# Patient Record
Sex: MALE | Race: BLACK OR AFRICAN AMERICAN | NOT HISPANIC OR LATINO | URBAN - METROPOLITAN AREA
[De-identification: names, ages, dates, MRNs, and addresses within clinical notes are randomized per-mention and may not be internally consistent; named-entity substitution may affect disease eponyms.]

---

## 2022-06-20 ENCOUNTER — EMERGENCY (EMERGENCY)
Facility: HOSPITAL | Age: 37
LOS: 1 days | Discharge: HOME | End: 2022-06-20
Admitting: STUDENT IN AN ORGANIZED HEALTH CARE EDUCATION/TRAINING PROGRAM
Payer: SELF-PAY

## 2022-06-20 VITALS
HEART RATE: 71 BPM | OXYGEN SATURATION: 97 % | HEIGHT: 73 IN | TEMPERATURE: 98 F | SYSTOLIC BLOOD PRESSURE: 149 MMHG | RESPIRATION RATE: 18 BRPM | WEIGHT: 218.04 LBS | DIASTOLIC BLOOD PRESSURE: 78 MMHG

## 2022-06-20 PROCEDURE — 99282 EMERGENCY DEPT VISIT SF MDM: CPT

## 2022-06-20 PROCEDURE — 99283 EMERGENCY DEPT VISIT LOW MDM: CPT

## 2022-06-20 RX ORDER — HYDROCORTISONE 1 %
1 OINTMENT (GRAM) TOPICAL
Qty: 1 | Refills: 0
Start: 2022-06-20 | End: 2022-06-26

## 2022-06-20 NOTE — ED PROVIDER NOTE - OBJECTIVE STATEMENT
35 y/o male with no PMHx is here in the ED c/o rectal pain x2-3 days. Pain is intermittent not associated with bm. He denies the following: fever, chills, abdominal pain, n/v, constipation/diarrhea, urinary symptoms, bleeding, itching, mass/lesions, anal sex or fb insertion. Furthermore, despite triage, pt denies any flu-like symptoms.

## 2022-06-20 NOTE — ED PROVIDER NOTE - PATIENT PORTAL LINK FT
You can access the FollowMyHealth Patient Portal offered by [facility] by registering at the following website: [email or web address]. By joining [employer]’s FollowMyHealth portal, you will also be able to view your health information using other applications (apps) compatible with our system.

## 2022-06-20 NOTE — ED ADULT NURSE NOTE - CAS ELECT INFOMATION PROVIDED
Pt, D/C with outpatient follow-up./DC instructions Pt, D/C with outpatient follow-up. Seen and eval by PA, D/C in no distress./DC instructions

## 2022-06-20 NOTE — ED PROVIDER NOTE - NSFOLLOWUPINSTRUCTIONS_ED_ALL_ED_FT
Return to the Emergency Department if you have any new or worsening symptoms, or if you have any concerns. Otherwise, please follow up with colorectal surgeon.    Please reach out to Darlyn Bernard ([facility] clinical referral coordinator) to assist you with your follow-up appointment.     Monday - Friday 11am-7pm  [phone]  [email or web address]    Hemorrhoids are collections of submucosal, fibrovascular, arteriovenous sinusoids that are part of the normal anorectum [1]. In epidemiologic studies conducted in [place], [place], and [place], between 14 and 39 percent of the population had hemorrhoids; however, many were without symptoms [2-4]. Hemorrhoids without any symptoms do not require treatment.     By contrast, an estimated 10 million people in the [place] complained of hemorrhoids, corresponding to a prevalence of 4.4 percent [5]. The most common complaint associated with hemorrhoidal disease is painless rectal bleeding during defecation with or without tissue prolapse. Other typical symptoms include anal pruritus or pain and a lump at the anal verge due to thrombosis or strangulation [2]. (See "Hemorrhoids: Clinical manifestations and diagnosis", section on 'Clinical manifestations'.)    Several options are available for the treatment of symptomatic hemorrhoids, and most patients with low-grade internal hemorrhoids will have relief with home-based conservative treatment or office-based procedures that are reviewed in this topic. Surgery is indicated for low-grade hemorrhoids that are refractory to home- or office-based treatments, high-grade hemorrhoids, and complicated hemorrhoids. Surgical treatment of hemorrhoids is reviewed separately. (See "Surgical treatment of hemorrhoidal disease".)

## 2022-06-20 NOTE — ED PROVIDER NOTE - CARE PROVIDER_API CALL
Danyel Garibay)  ColonRectal Surgery; Surgery  [address] [place] [address], [address]  [place] [address]  Phone: [phone]  Fax: [phone]  Follow Up Time:

## 2022-06-20 NOTE — ED PROVIDER NOTE - CLINICAL SUMMARY MEDICAL DECISION MAKING FREE TEXT BOX
35 y/o male here in the ED c/o rectal pain. + rectal hemorrhoids noted on exam. No thrombosed hemorrhoids and no bleeding and no rectal abscess. No concern for stds. No constitutional symptoms. No constipation/diarrhea. Advised creme and follow up with rectal. I have discussed the discharge plan with the patient. The patient agrees with the plan, as discussed.  The patient understands Emergency Department diagnosis is a preliminary diagnosis often based on limited information and that the patient must adhere to the follow-up plan as discussed.  The patient understands that if the symptoms worsen or if prescribed medications do not have the desired/planned effect that the patient may return to the Emergency Department at any time for further evaluation and treatment.

## 2022-06-24 DIAGNOSIS — K62.89 OTHER SPECIFIED DISEASES OF ANUS AND RECTUM: ICD-10-CM

## 2022-06-24 DIAGNOSIS — K64.9 UNSPECIFIED HEMORRHOIDS: ICD-10-CM
